# Patient Record
Sex: FEMALE | Race: BLACK OR AFRICAN AMERICAN
[De-identification: names, ages, dates, MRNs, and addresses within clinical notes are randomized per-mention and may not be internally consistent; named-entity substitution may affect disease eponyms.]

---

## 2020-03-14 ENCOUNTER — HOSPITAL ENCOUNTER (EMERGENCY)
Dept: HOSPITAL 62 - ER | Age: 60
Discharge: HOME | End: 2020-03-14
Payer: MEDICARE

## 2020-03-14 VITALS — SYSTOLIC BLOOD PRESSURE: 170 MMHG | DIASTOLIC BLOOD PRESSURE: 89 MMHG

## 2020-03-14 DIAGNOSIS — R06.02: ICD-10-CM

## 2020-03-14 DIAGNOSIS — E10.9: ICD-10-CM

## 2020-03-14 DIAGNOSIS — I50.9: Primary | ICD-10-CM

## 2020-03-14 DIAGNOSIS — Z91.14: ICD-10-CM

## 2020-03-14 DIAGNOSIS — Z91.128: ICD-10-CM

## 2020-03-14 DIAGNOSIS — T50.1X6A: ICD-10-CM

## 2020-03-14 DIAGNOSIS — Z79.4: ICD-10-CM

## 2020-03-14 LAB
ADD MANUAL DIFF: NO
ALBUMIN SERPL-MCNC: 3.8 G/DL (ref 3.5–5)
ALP SERPL-CCNC: 70 U/L (ref 38–126)
ANION GAP SERPL CALC-SCNC: 6 MMOL/L (ref 5–19)
AST SERPL-CCNC: 39 U/L (ref 14–36)
BASOPHILS # BLD AUTO: 0 10^3/UL (ref 0–0.2)
BASOPHILS NFR BLD AUTO: 0.4 % (ref 0–2)
BILIRUB DIRECT SERPL-MCNC: 0.3 MG/DL (ref 0–0.4)
BILIRUB SERPL-MCNC: 0.6 MG/DL (ref 0.2–1.3)
BUN SERPL-MCNC: 19 MG/DL (ref 7–20)
CALCIUM: 8.8 MG/DL (ref 8.4–10.2)
CHLORIDE SERPL-SCNC: 105 MMOL/L (ref 98–107)
CO2 SERPL-SCNC: 31 MMOL/L (ref 22–30)
EOSINOPHIL # BLD AUTO: 0 10^3/UL (ref 0–0.6)
EOSINOPHIL NFR BLD AUTO: 0.8 % (ref 0–6)
ERYTHROCYTE [DISTWIDTH] IN BLOOD BY AUTOMATED COUNT: 15.3 % (ref 11.5–14)
GLUCOSE SERPL-MCNC: 223 MG/DL (ref 75–110)
HCT VFR BLD CALC: 36.7 % (ref 36–47)
HGB BLD-MCNC: 12 G/DL (ref 12–15.5)
LYMPHOCYTES # BLD AUTO: 1.4 10^3/UL (ref 0.5–4.7)
LYMPHOCYTES NFR BLD AUTO: 28.2 % (ref 13–45)
MCH RBC QN AUTO: 27.6 PG (ref 27–33.4)
MCHC RBC AUTO-ENTMCNC: 32.7 G/DL (ref 32–36)
MCV RBC AUTO: 84 FL (ref 80–97)
MONOCYTES # BLD AUTO: 0.5 10^3/UL (ref 0.1–1.4)
MONOCYTES NFR BLD AUTO: 10.2 % (ref 3–13)
NEUTROPHILS # BLD AUTO: 3 10^3/UL (ref 1.7–8.2)
NEUTS SEG NFR BLD AUTO: 60.4 % (ref 42–78)
NT PRO BNP: 5100 PG/ML (ref ?–125)
PLATELET # BLD: 177 10^3/UL (ref 150–450)
POTASSIUM SERPL-SCNC: 4.6 MMOL/L (ref 3.6–5)
PROT SERPL-MCNC: 7.3 G/DL (ref 6.3–8.2)
RBC # BLD AUTO: 4.35 10^6/UL (ref 3.72–5.28)
TOTAL CELLS COUNTED % (AUTO): 100 %
TROPONIN I SERPL-MCNC: < 0.012 NG/ML
WBC # BLD AUTO: 4.9 10^3/UL (ref 4–10.5)

## 2020-03-14 PROCEDURE — 84484 ASSAY OF TROPONIN QUANT: CPT

## 2020-03-14 PROCEDURE — 83880 ASSAY OF NATRIURETIC PEPTIDE: CPT

## 2020-03-14 PROCEDURE — 36415 COLL VENOUS BLD VENIPUNCTURE: CPT

## 2020-03-14 PROCEDURE — 85025 COMPLETE CBC W/AUTO DIFF WBC: CPT

## 2020-03-14 PROCEDURE — 96374 THER/PROPH/DIAG INJ IV PUSH: CPT

## 2020-03-14 PROCEDURE — 99282 EMERGENCY DEPT VISIT SF MDM: CPT

## 2020-03-14 PROCEDURE — 93010 ELECTROCARDIOGRAM REPORT: CPT

## 2020-03-14 PROCEDURE — 71046 X-RAY EXAM CHEST 2 VIEWS: CPT

## 2020-03-14 PROCEDURE — 80053 COMPREHEN METABOLIC PANEL: CPT

## 2020-03-14 PROCEDURE — 93005 ELECTROCARDIOGRAM TRACING: CPT

## 2020-03-14 NOTE — RADIOLOGY REPORT (SQ)
EXAM DESCRIPTION:  CHEST 2 VIEWS



COMPLETED DATE/TIME:  3/14/2020 10:15 am



REASON FOR STUDY:  sob



COMPARISON:  None.



NUMBER OF VIEWS:  Two view.



TECHNIQUE:  Frontal and lateral radiographic views of the chest acquired.



LIMITATIONS:  None.



FINDINGS:  LUNGS AND PLEURA: No opacities, masses or pneumothorax. No pleural effusion.

MEDIASTINUM AND HILAR STRUCTURES: No masses.  No contour abnormalities.

HEART AND VASCULAR STRUCTURES: Heart enlarged without failure.  Aorta normal for age.

BONES: No acute findings.

HARDWARE: None in the chest.

OTHER: No other significant finding.



IMPRESSION:  CARDIAC ENLARGEMENT WITHOUT FAILURE.



TECHNICAL DOCUMENTATION:  JOB ID:  [id number]

 2011 [employer]- All Rights Reserved



Reading location - IP/workstation name: [email or web address]

## 2020-03-14 NOTE — ER DOCUMENT REPORT
ED Respiratory Problem





- General


Chief Complaint: Shortness Of Breath


Stated Complaint: SHORTNESS OF BREATH


Time Seen by Provider: 03/14/20 09:12


Notes: 





CHIEF COMPLAINT: Shortness of breath today





HPI: 60-year-old female with a history of CHF and type 1 diabetes presenting for

shortness of breath today.  Patient is visiting from out of town, states she 

left her Lasix at home has been taking her insulin.  She has been out of her 

Lasix for 3 days and now feels like her heart failure is starting to act up.  No

chest pain.  Patient states she normally takes Lasix 40 mg daily.  No abdominal 

pain nausea vomiting or fever.





ROS: See HPI - all other systems were reviewed and are otherwise negative


Constitutional: no fever 


Eyes: no drainage, no blurred vision


ENT: no runny nose, no sore throat


Cardiovascular:  no chest pain 


Resp: + SOB, no cough


GI: no vomiting, no diarrhea, no abdominal pain


: no dysuria


Integumentary: no rash 


Allergy: no hives 


Musculoskeletal: no extremity pain, positive swelling 


Neurological: no numbness/tingling, no weakness





MEDICATIONS: I agree with the patient medications as charted by the RN.





ALLERGIES: I agree with the allergies as charted by the RN.





PAST MEDICAL HISTORY/PAST SURGICAL HISTORY: Reviewed and agree as charted by RN.





SOCIAL HISTORY: Reviewed and agree as charted by RN.





FAMILY HISTORY: No significant familial comorbid conditions directly related to 

patient complaint





EXAM:


Reviewed vital signs as charted by RN.


CONSTITUTIONAL: Alert and oriented and responds appropriately to questions. 

Well-appearing; well-nourished, mild distress secondary to shortness of breath


HEAD: Normocephalic; atraumatic


EYES: PERRL; Conjunctivae clear, sclerae non-icteric


ENT: normal nose; no rhinorrhea; moist mucous membranes; pharynx without lesions

noted, no uvula edema or deviation, no tonsillar hypertrophy, phonation normal


NECK: Supple without meningismus; non-tender; no cervical lymphadenopathy, no 

masses


CARD: RRR; no murmurs, no clicks, no rubs, no gallops; symmetric distal pulses


RESP: Normal chest excursion without splinting or tachypnea; breath sounds clear

and equal bilaterally; no wheezes, no rhonchi, no rales, pulse oximetry 97% on 

room air not hypoxic


ABD/GI: Morbidly obese, normal bowel sounds; non-distended; soft, non-tender, no

rebound, no guarding; no palpable organomegaly or masses.


BACK:  The back appears normal and is non-tender to palpation, there is no CVA 

tenderness


EXT: Normal ROM in all joints; non-tender to palpation; no cyanosis, no 

effusions, 1+ bilateral ankle edema   


SKIN: Normal color for age and race; warm; dry; good turgor; no acute lesions 

noted


NEURO: Moves all extremities equally; Motor and sensory function intact 


PSYCH: The patient's mood and manner are appropriate. Grooming and personal 

hygiene are appropriate.





MDM: 60-year-old female with CHF history out of her Lasix for 3 days.  Will 

check baseline screening labs including cardiac labs.  Will give patient IV 

Lasix and reassess





Past Medical History





- Social History


Smoking Status: Unknown if Ever Smoked


Family History: Reviewed & Not Pertinent





Physical Exam





- Vital signs


Vitals: 


                                        











Temp Pulse Resp BP Pulse Ox


 


 98.1 F   89   26 H  194/95 H  96 


 


 03/14/20 08:50  03/14/20 08:50  03/14/20 08:50  03/14/20 08:50  03/14/20 08:50














Course





- Re-evaluation


Re-evalutation: 





03/14/20 10:34


discussed with Dr. Lewis, Attending.  BNP is greater than 5000.  I discussed 

this with the patient.  I did offer admission she would like to go home.  She 

will be in town for another 3 to 4 days I will write her for a weeks worth of 

Lasix.  We will also place patient on 3 days of Zaroxolyn.  Patient knows that 

she can return if she has any worsening shortness of breath.





- Vital Signs


Vital signs: 


                                        











Temp Pulse Resp BP Pulse Ox


 


 98.1 F   89   19   170/89 H  96 


 


 03/14/20 08:50  03/14/20 08:50  03/14/20 10:01  03/14/20 10:01  03/14/20 10:01














- Laboratory


Result Diagrams: 


                                 03/14/20 09:35





                                 03/14/20 09:35


Laboratory results interpreted by me: 


                                        











  03/14/20 03/14/20 03/14/20





  09:35 09:35 09:35


 


RDW  15.3 H  


 


Carbon Dioxide   31 H 


 


Est GFR (MDRD) Non-Af   57 L 


 


Glucose   223 H 


 


AST   39 H 


 


NT-Pro-B Natriuret Pep    5100 H














Discharge





- Discharge


Clinical Impression: 


Acute exacerbation of CHF (congestive heart failure)


Qualifiers:


 Heart failure type: unspecified Qualified Code(s): I50.9 - Heart failure, un

specified





Condition: Stable


Disposition: HOME, SELF-CARE


Additional Instructions: 


Take the medications as prescribed.  Return for worsening shortness of breath or

edema


Prescriptions: 


Furosemide [Lasix 40 mg Tablet] 40 mg PO QAM #15 tablet


Metolazone [Zaroxolyn 5 Mg Tablet] 5 mg PO QAM #3 tablet

## 2020-03-16 NOTE — EKG REPORT
SEVERITY:- ABNORMAL ECG -

SINUS RHYTHM

PROBABLE LEFT ATRIAL ABNORMALITY

PROBABLE LEFT VENTRICULAR HYPERTROPHY

NONSPECIFIC T ABNORMALITIES, INFERIOR LEADS

:

Confirmed by: Julian Ricci 16-Mar-2020 00:42:24